# Patient Record
Sex: FEMALE | Race: WHITE | Employment: FULL TIME | URBAN - NONMETROPOLITAN AREA
[De-identification: names, ages, dates, MRNs, and addresses within clinical notes are randomized per-mention and may not be internally consistent; named-entity substitution may affect disease eponyms.]

---

## 2018-02-03 ENCOUNTER — HOSPITAL ENCOUNTER (OUTPATIENT)
Dept: LAB | Age: 38
Discharge: HOME | End: 2018-02-03
Payer: COMMERCIAL

## 2018-02-03 DIAGNOSIS — R53.83 FATIGUE, UNSPECIFIED TYPE: ICD-10-CM

## 2018-02-03 LAB
ABSOLUTE EOS #: 0 K/UL (ref 0–0.4)
ABSOLUTE IMMATURE GRANULOCYTE: NORMAL K/UL (ref 0–0.3)
ABSOLUTE LYMPH #: 2 K/UL (ref 1–4.8)
ABSOLUTE MONO #: 0.5 K/UL (ref 0–1)
ALBUMIN SERPL-MCNC: 4.2 G/DL (ref 3.5–5.2)
ALBUMIN/GLOBULIN RATIO: 1.2 (ref 1–2.5)
ALP BLD-CCNC: 67 U/L (ref 35–104)
ALT SERPL-CCNC: 14 U/L (ref 5–33)
ANION GAP SERPL CALCULATED.3IONS-SCNC: 12 MMOL/L (ref 9–17)
AST SERPL-CCNC: 16 U/L
BASOPHILS # BLD: 0 % (ref 0–2)
BASOPHILS ABSOLUTE: 0 K/UL (ref 0–0.2)
BILIRUB SERPL-MCNC: 0.26 MG/DL (ref 0.3–1.2)
BUN BLDV-MCNC: 18 MG/DL (ref 6–20)
BUN/CREAT BLD: 23 (ref 9–20)
CALCIUM SERPL-MCNC: 9.4 MG/DL (ref 8.6–10.4)
CHLORIDE BLD-SCNC: 99 MMOL/L (ref 98–107)
CHOLESTEROL/HDL RATIO: 3.5
CHOLESTEROL: 208 MG/DL
CO2: 27 MMOL/L (ref 20–31)
CREAT SERPL-MCNC: 0.77 MG/DL (ref 0.5–0.9)
DIFFERENTIAL TYPE: NORMAL
EOSINOPHILS RELATIVE PERCENT: 0 % (ref 0–8)
GFR AFRICAN AMERICAN: >60 ML/MIN
GFR NON-AFRICAN AMERICAN: >60 ML/MIN
GFR SERPL CREATININE-BSD FRML MDRD: ABNORMAL ML/MIN/{1.73_M2}
GFR SERPL CREATININE-BSD FRML MDRD: ABNORMAL ML/MIN/{1.73_M2}
GLUCOSE BLD-MCNC: 83 MG/DL (ref 70–99)
HCT VFR BLD CALC: 41.4 % (ref 36–46)
HDLC SERPL-MCNC: 59 MG/DL
HEMOGLOBIN: 13.9 G/DL (ref 12–16)
IMMATURE GRANULOCYTES: NORMAL %
LDL CHOLESTEROL: 125 MG/DL (ref 0–130)
LYMPHOCYTES # BLD: 30 % (ref 24–44)
MCH RBC QN AUTO: 29.2 PG (ref 26–34)
MCHC RBC AUTO-ENTMCNC: 33.5 G/DL (ref 31–37)
MCV RBC AUTO: 87.1 FL (ref 80–100)
MONOCYTES # BLD: 7 % (ref 0–12)
NRBC AUTOMATED: NORMAL PER 100 WBC
PDW BLD-RTO: 14.1 % (ref 12.1–15.2)
PLATELET # BLD: 204 K/UL (ref 140–450)
PLATELET ESTIMATE: NORMAL
PMV BLD AUTO: 9.3 FL (ref 6–12)
POTASSIUM SERPL-SCNC: 4 MMOL/L (ref 3.7–5.3)
RBC # BLD: 4.76 M/UL (ref 4–5.2)
RBC # BLD: NORMAL 10*6/UL
SEG NEUTROPHILS: 63 % (ref 36–66)
SEGMENTED NEUTROPHILS ABSOLUTE COUNT: 4.1 K/UL (ref 1.8–7.7)
SODIUM BLD-SCNC: 138 MMOL/L (ref 135–144)
TOTAL PROTEIN: 7.6 G/DL (ref 6.4–8.3)
TRIGL SERPL-MCNC: 122 MG/DL
TSH SERPL DL<=0.05 MIU/L-ACNC: 7.53 MIU/L (ref 0.3–5)
VLDLC SERPL CALC-MCNC: ABNORMAL MG/DL (ref 1–30)
WBC # BLD: 6.6 K/UL (ref 3.5–11)
WBC # BLD: NORMAL 10*3/UL

## 2018-02-03 PROCEDURE — 84443 ASSAY THYROID STIM HORMONE: CPT

## 2018-02-03 PROCEDURE — 80061 LIPID PANEL: CPT

## 2018-02-03 PROCEDURE — 36415 COLL VENOUS BLD VENIPUNCTURE: CPT

## 2018-02-03 PROCEDURE — 80053 COMPREHEN METABOLIC PANEL: CPT

## 2018-02-03 PROCEDURE — 85025 COMPLETE CBC W/AUTO DIFF WBC: CPT

## 2018-03-07 ENCOUNTER — HOSPITAL ENCOUNTER (OUTPATIENT)
Age: 38
Discharge: HOME | End: 2018-03-07
Payer: COMMERCIAL

## 2018-03-07 DIAGNOSIS — E03.9 HYPOTHYROIDISM, UNSPECIFIED TYPE: ICD-10-CM

## 2018-03-07 LAB — TSH SERPL DL<=0.05 MIU/L-ACNC: 0.29 MIU/L (ref 0.3–5)

## 2018-03-07 PROCEDURE — 36415 COLL VENOUS BLD VENIPUNCTURE: CPT

## 2018-03-07 PROCEDURE — 84443 ASSAY THYROID STIM HORMONE: CPT

## 2018-05-07 ENCOUNTER — HOSPITAL ENCOUNTER (OUTPATIENT)
Age: 38
Discharge: HOME | End: 2018-05-07
Payer: COMMERCIAL

## 2018-05-07 DIAGNOSIS — E03.9 HYPOTHYROIDISM, UNSPECIFIED TYPE: ICD-10-CM

## 2018-05-07 LAB — TSH SERPL DL<=0.05 MIU/L-ACNC: 0.04 MIU/L (ref 0.3–5)

## 2018-05-07 PROCEDURE — 36415 COLL VENOUS BLD VENIPUNCTURE: CPT

## 2018-05-07 PROCEDURE — 84443 ASSAY THYROID STIM HORMONE: CPT

## 2018-05-08 PROBLEM — E03.9 HYPOTHYROIDISM: Status: ACTIVE | Noted: 2018-05-08

## 2019-06-13 ENCOUNTER — HOSPITAL ENCOUNTER (EMERGENCY)
Age: 39
Discharge: HOME | End: 2019-06-13
Payer: COMMERCIAL

## 2019-06-13 VITALS
TEMPERATURE: 98.4 F | HEART RATE: 70 BPM | DIASTOLIC BLOOD PRESSURE: 86 MMHG | SYSTOLIC BLOOD PRESSURE: 124 MMHG | WEIGHT: 200 LBS | OXYGEN SATURATION: 99 % | BODY MASS INDEX: 31.32 KG/M2 | RESPIRATION RATE: 16 BRPM

## 2019-06-13 DIAGNOSIS — T78.2XXA ACUTE ANAPHYLAXIS, INITIAL ENCOUNTER: Primary | ICD-10-CM

## 2019-06-13 PROCEDURE — 96372 THER/PROPH/DIAG INJ SC/IM: CPT

## 2019-06-13 PROCEDURE — 96374 THER/PROPH/DIAG INJ IV PUSH: CPT

## 2019-06-13 PROCEDURE — 99283 EMERGENCY DEPT VISIT LOW MDM: CPT

## 2019-06-13 PROCEDURE — 2500000003 HC RX 250 WO HCPCS: Performed by: PHYSICIAN ASSISTANT

## 2019-06-13 PROCEDURE — 96376 TX/PRO/DX INJ SAME DRUG ADON: CPT

## 2019-06-13 PROCEDURE — 6360000002 HC RX W HCPCS: Performed by: PHYSICIAN ASSISTANT

## 2019-06-13 PROCEDURE — 96375 TX/PRO/DX INJ NEW DRUG ADDON: CPT

## 2019-06-13 PROCEDURE — 6360000002 HC RX W HCPCS

## 2019-06-13 RX ORDER — PREDNISONE 10 MG/1
TABLET ORAL
Qty: 30 TABLET | Refills: 0 | Status: SHIPPED | OUTPATIENT
Start: 2019-06-13 | End: 2020-07-06

## 2019-06-13 RX ORDER — FAMOTIDINE 20 MG/1
20 TABLET, FILM COATED ORAL 2 TIMES DAILY
Qty: 20 TABLET | Refills: 0 | Status: SHIPPED | OUTPATIENT
Start: 2019-06-13 | End: 2020-07-06

## 2019-06-13 RX ORDER — EPINEPHRINE 1 MG/ML
INJECTION, SOLUTION, CONCENTRATE INTRAVENOUS
Status: COMPLETED
Start: 2019-06-13 | End: 2019-06-13

## 2019-06-13 RX ORDER — DIPHENHYDRAMINE HYDROCHLORIDE 50 MG/ML
25 INJECTION INTRAMUSCULAR; INTRAVENOUS ONCE
Status: COMPLETED | OUTPATIENT
Start: 2019-06-13 | End: 2019-06-13

## 2019-06-13 RX ORDER — DIPHENHYDRAMINE HCL 25 MG
25 TABLET ORAL EVERY 6 HOURS PRN
Qty: 30 TABLET | Refills: 0 | Status: SHIPPED | OUTPATIENT
Start: 2019-06-13 | End: 2019-06-23

## 2019-06-13 RX ORDER — METHYLPREDNISOLONE SODIUM SUCCINATE 125 MG/2ML
125 INJECTION, POWDER, LYOPHILIZED, FOR SOLUTION INTRAMUSCULAR; INTRAVENOUS ONCE
Status: COMPLETED | OUTPATIENT
Start: 2019-06-13 | End: 2019-06-13

## 2019-06-13 RX ORDER — EPINEPHRINE 0.3 MG/.3ML
0.3 INJECTION SUBCUTANEOUS
Qty: 1 EACH | Refills: 2 | Status: SHIPPED | OUTPATIENT
Start: 2019-06-13 | End: 2021-07-21

## 2019-06-13 RX ORDER — EPINEPHRINE 1 MG/ML
0.3 INJECTION, SOLUTION, CONCENTRATE INTRAVENOUS ONCE
Status: COMPLETED | OUTPATIENT
Start: 2019-06-13 | End: 2019-06-13

## 2019-06-13 RX ADMIN — METHYLPREDNISOLONE SODIUM SUCCINATE 125 MG: 125 INJECTION, POWDER, FOR SOLUTION INTRAMUSCULAR; INTRAVENOUS at 11:05

## 2019-06-13 RX ADMIN — FAMOTIDINE 20 MG: 10 INJECTION, SOLUTION INTRAVENOUS at 11:07

## 2019-06-13 RX ADMIN — EPINEPHRINE 0.3 MG: 1 INJECTION, SOLUTION, CONCENTRATE INTRAVENOUS at 11:01

## 2019-06-13 RX ADMIN — DIPHENHYDRAMINE HYDROCHLORIDE 25 MG: 50 INJECTION, SOLUTION INTRAMUSCULAR; INTRAVENOUS at 14:29

## 2019-06-13 RX ADMIN — DIPHENHYDRAMINE HYDROCHLORIDE 25 MG: 50 INJECTION, SOLUTION INTRAMUSCULAR; INTRAVENOUS at 11:06

## 2019-06-13 NOTE — ED NOTES
Pt in room on stretcher with eyes closed. Pt up to the restroom to void. Pt denies needs at this time. Will continue to monitor and call light is within reach.      Faisal Figueredo RN  06/13/19 5099

## 2019-06-13 NOTE — ED PROVIDER NOTES
[facility] ED  EMERGENCY DEPARTMENT ENCOUNTER      Pt Name: Rossana Padilla  MRN: [id number]  [place] 1980  Date of evaluation: 6/13/2019  Provider: Eladio Seymour PA-C    CHIEF COMPLAINT       Chief Complaint   Patient presents with    Allergic Reaction     pt states she was changing the toner in the printer at work just PTA       [address] Arturo Story is a 45 y.o. female who presents to the emergency department from work with an allergic reaction. She has never had a reaction like this before. She was making copies at a coffee machine when she felt itching around her eyes she rubs her eyes and then the rash spread she states now she has hoarse voice swelling around her lips and eyes and itching all over. She denies feeling short of breath but does state that she has a fullness in her throat. Triage notes and Nursing notes were reviewed by myself. Any discrepancies are addressed above. PAST MEDICAL HISTORY     Past Medical History:   Diagnosis Date    Acne     Anxiety     Asthma     Dizziness and giddiness     Headache     Infection of skin and subcutaneous tissue     Palpitations     Stress        SURGICAL HISTORY       Past Surgical History:   Procedure Laterality Date    TUBAL LIGATION  4/2012    ESSURE (Dr Elo Garcia)       [address]       Discharge Medication List as of 6/13/2019  3:16 PM      CONTINUE these medications which have NOT CHANGED    Details   levothyroxine (SYNTHROID) 88 MCG tablet Take 1 tablet by mouth Daily, Disp-90 tablet, R-0Normal      albuterol sulfate HFA (PROAIR HFA) 108 (90 Base) MCG/ACT inhaler Inhale 2 puffs into the lungs every 6 hours as needed for Wheezing, Disp-1 Inhaler, R-3Normal      Sulfamethoxazole-Trimethoprim (BACTRIM PO) Take  by mouth daily. Indications: Acne Flare      spironolactone (ALDACTONE) 100 MG tablet Take 100 mg by mouth daily.              ALLERGIES     Iv dye [iodides] and Optiray [ioversol]    FAMILY HISTORY       Family History   Problem Relation Age of Onset    High Blood Pressure Mother     Heart Disease Father         SOCIAL HISTORY       Social History     Socioeconomic History    Marital status:      Spouse name: Not on file    Number of children: Not on file    Years of education: Not on file    Highest education level: Not on file   Occupational History    Not on file   Social Needs    Financial resource strain: Not on file    Food insecurity:     Worry: Not on file     Inability: Not on file    Transportation needs:     Medical: Not on file     Non-medical: Not on file   Tobacco Use    Smoking status: Former Smoker     Types: Cigarettes    Smokeless tobacco: Former User     Quit date: 8/24/2016   Substance and Sexual Activity    Alcohol use: Yes     Comment: rare    Drug use: No    Sexual activity: Never   Lifestyle    Physical activity:     Days per week: Not on file     Minutes per session: Not on file    Stress: Not on file   Relationships    Social connections:     Talks on phone: Not on file     Gets together: Not on file     Attends Jew service: Not on file     Active member of club or organization: Not on file     Attends meetings of clubs or organizations: Not on file     Relationship status: Not on file    Intimate partner violence:     Fear of current or ex partner: Not on file     Emotionally abused: Not on file     Physically abused: Not on file     Forced sexual activity: Not on file   Other Topics Concern    Not on file   Social History Narrative    Not on file       REVIEW OF SYSTEMS     Review of Systems  Except as noted above the remainder of the review of systems was reviewed and is negative.      SCREENINGS           PHYSICAL EXAM    (up to 7 for level 4, 8 or more for level 5)     ED Triage Vitals [06/13/19 1056]   BP Temp Temp Source Pulse Resp SpO2 Height Weight   (!) 140/84 98.4 °F (36.9 °C) Tympanic 67 20 100 % -- 200 lb (90.7 kg)       Physical Exam  Active and oriented ×3. Nontoxic. And is in mild distress with facial edema noted and a fullness to her voice. Well-hydrated. Head is atraumatic, there is mild edema to the upper and lower lips. Edema of the bilateral eyelids and periorbital area left greater than right. Bilateral scleral injection is noted. Pupils equal round and reactive to light, extraocular movements intact, anterior chambers clear bilaterally  Neck is supple. No adenopathy. Throat with edema of the uvula and soft palate and mild edema of the oropharynx noted, he remains patent at this time. Respirations nonlabored. Lungs clear to auscultation. No wheezes rales or rhonchi noted. Stridor noted  Heart regular rate and rhythm. No murmur noted  Facial edema and edema of the lips. Acute generalized urticaria noted. Extremities without edema. No calf tenderness noted. Distal pulses and sensation intact. Good capillary refill noted. No acute neurologic deficit noted. Good gait and balance. Clear speech. Good affect. Pleasant patient. DIAGNOSTIC RESULTS     none    EMERGENCY DEPARTMENT COURSE andMedical Decision Making:     Vitals:    Vitals:    06/13/19 1342 06/13/19 1445 06/13/19 1446 06/13/19 1533   BP: 126/77  124/86    Pulse: 74 70     Resp: 16  16 16   Temp:       TempSrc:       SpO2: 98% 97%  99%   Weight:           MDM/     Case was discussed with and the patient examined by the attending emergency physician Dr. Caballero [employer]    Patient was irrigated with epinephrine 0.3 mL's intramuscularly. She was treated with IV Solu-Medrol 125 mg, IV Benadryl 25 mg, IV Pepcid 20 mg. She began to improve her voice improved. Repeat examination showed some improvement to the edema of her uvula and soft palate although the minimal edema was still noted. She was treated with another dose of Benadryl 25 mg IV. She was observed for period of 3-1/2 hours and seemed to continually improve.   Her  arrives and sits with her and states that now her voice has returned to normal and he denies any hoarseness, she then states that she feels that there is no swelling in her throat and she has gotten better swelling in her eyes has completely resolved. On visual examination I still feel there is some minimal edema of the soft palate but this is very minimal in nature and the patient states she feels much better. I feel it is reasonable to discharge patient home with a low threshold to return should she experience any hoarseness or swelling of the throat or globus sensation she should be seen again immediately or call 911. I will prescribe EpiPen and placed on a tapering dose of prednisone Benadryl and Pepcid. I do recommend discontinuing the patient's minocycline, while she states she has been on this for many years on a daily basis she states she has not taken any other medications or antibiotics in many months and this may be the cause of the development of a new allergy I recommend stopping this medications until she discusses this further with her physician. Strict return precautions and follow up instructions were discussed with the patient with which the patient agrees    ED Medications administered this visit:    Medications   EPINEPHrine PF 1 MG/ML injection 0.3 mg (0.3 mg Intramuscular Given 6/13/19 1101)   diphenhydrAMINE (BENADRYL) injection 25 mg (25 mg Intravenous Given 6/13/19 1106)   methylPREDNISolone sodium (SOLU-MEDROL) injection 125 mg (125 mg Intravenous Given 6/13/19 1105)   famotidine (PEPCID) injection 20 mg (20 mg Intravenous Given 6/13/19 1107)   diphenhydrAMINE (BENADRYL) injection 25 mg (25 mg Intravenous Given 6/13/19 1429)       CONSULTS: (None if blank)  None    Procedures: (None if blank)       CLINICAL       1.  Acute anaphylaxis, initial encounter          DISPOSITION/PLAN   DISPOSITION Decision To Discharge 06/13/2019 03:32:46 PM      PATIENT [facility]:  [facility]  [phone]    In 2 days        DISCHARGE MEDICATIONS:  Discharge Medication List as of 6/13/2019  3:16 PM      START taking these medications    Details   predniSONE (DELTASONE) 10 MG tablet Take 4 tablets once a day for days 1 - 3, then take 3 tablets once a day on days 4 - 6, then take 2 tablets once a day on days 7 - 9 then take one tablet once a day on days 10 - 12., Disp-30 tablet, R-0Print      diphenhydrAMINE (BENADRYL ALLERGY) 25 MG tablet Take 1 tablet by mouth every 6 hours as needed for Itching, Disp-30 tablet, R-0Print      famotidine (PEPCID) 20 MG tablet Take 1 tablet by mouth 2 times daily for 10 days, Disp-20 tablet, R-0Print      EPINEPHrine (EPIPEN 2-TARA) 0.3 MG/0.3ML SOAJ injection Inject 0.3 mLs into the muscle once as needed (allergy) Use as directed for allergic reaction, Disp-1 each, R-2Print                    (Please note that portions of this note were completed with a voice recognition program.  Efforts were made to edit the dictations but occasionallywords are mis-transcribed.)      Aftab Chow PA-C (electronically signed)  Attending Emergency Department Provider         Negra Don II, PA-C  06/13/19 1214

## 2019-06-13 NOTE — ED PROVIDER NOTES
[facility]     Emergency Department     Faculty Attestation    I performed a history and physical examination of the patient and discussed management with the resident. I have reviewed and agree with the residents findings including all diagnostic interpretations, and treatment plans as written. Any areas of disagreement are noted on the chart. I was personally present for the key portions of any procedures. I have documented in the chart those procedures where I was not present during the key portions. I have reviewed the emergency nurses triage note. I agree with the chief complaint, past medical history, past surgical history, allergies, medications, social and family history as documented unless otherwise noted below. Documentation of the HPI, Physical Exam and Medical Decision Making performed by reynaibchang is based on my personal performance of the HPI, PE and MDM. For Physician Assistant/ Nurse Practitioner cases/documentation I have personally evaluated this patient and have completed at least one if not all key elements of the E/M (history, physical exam, and MDM). Additional findings are as noted. Exposed to toner of copy machine, wipe her face, and then developed swelling to her eyes, and muffled voice. Patient speaking in muffled voice, has swelling to the uvula and post phaynx, is handling oral secretions, no stridor noted. Edema to bilateral orbital regions, Left>r, with chemosis.      Plan for epinephrine, steroids, benadryl, pepcid, cardiac monitor and observe  Patient at risk of needing intubation if edema gets worse    Loretta Gray D.O, M.P.H  Attending Emergency Medicine Physician         Samy Padilla, DO  06/13/19 9603

## 2020-07-16 ENCOUNTER — HOSPITAL ENCOUNTER (OUTPATIENT)
Age: 40
Discharge: HOME | End: 2020-07-16
Payer: COMMERCIAL

## 2020-07-16 LAB — TSH SERPL DL<=0.05 MIU/L-ACNC: 1.63 MIU/L (ref 0.3–5)

## 2020-07-16 PROCEDURE — 84443 ASSAY THYROID STIM HORMONE: CPT

## 2020-07-16 PROCEDURE — 36415 COLL VENOUS BLD VENIPUNCTURE: CPT

## 2021-07-21 ENCOUNTER — HOSPITAL ENCOUNTER (EMERGENCY)
Age: 41
Discharge: HOME | End: 2021-07-21
Attending: EMERGENCY MEDICINE
Payer: COMMERCIAL

## 2021-07-21 ENCOUNTER — APPOINTMENT (OUTPATIENT)
Dept: GENERAL RADIOLOGY | Age: 41
End: 2021-07-21
Payer: COMMERCIAL

## 2021-07-21 ENCOUNTER — NURSE TRIAGE (OUTPATIENT)
Dept: OTHER | Facility: CLINIC | Age: 41
End: 2021-07-21

## 2021-07-21 VITALS
DIASTOLIC BLOOD PRESSURE: 82 MMHG | HEART RATE: 66 BPM | SYSTOLIC BLOOD PRESSURE: 137 MMHG | OXYGEN SATURATION: 99 % | RESPIRATION RATE: 16 BRPM | TEMPERATURE: 97.8 F

## 2021-07-21 DIAGNOSIS — T78.40XA ALLERGIC REACTION, INITIAL ENCOUNTER: Primary | ICD-10-CM

## 2021-07-21 PROCEDURE — 6370000000 HC RX 637 (ALT 250 FOR IP): Performed by: EMERGENCY MEDICINE

## 2021-07-21 PROCEDURE — 70360 X-RAY EXAM OF NECK: CPT

## 2021-07-21 PROCEDURE — 96372 THER/PROPH/DIAG INJ SC/IM: CPT

## 2021-07-21 PROCEDURE — 6360000002 HC RX W HCPCS: Performed by: EMERGENCY MEDICINE

## 2021-07-21 PROCEDURE — 99284 EMERGENCY DEPT VISIT MOD MDM: CPT

## 2021-07-21 RX ORDER — PREDNISONE 50 MG/1
50 TABLET ORAL DAILY
Qty: 4 TABLET | Refills: 0 | Status: SHIPPED | OUTPATIENT
Start: 2021-07-22 | End: 2021-07-26

## 2021-07-21 RX ORDER — DIPHENHYDRAMINE HYDROCHLORIDE 50 MG/ML
25 INJECTION INTRAMUSCULAR; INTRAVENOUS ONCE
Status: COMPLETED | OUTPATIENT
Start: 2021-07-21 | End: 2021-07-21

## 2021-07-21 RX ORDER — EPINEPHRINE 0.3 MG/.3ML
0.3 INJECTION SUBCUTANEOUS ONCE
Qty: 0.3 ML | Refills: 0 | Status: SHIPPED | OUTPATIENT
Start: 2021-07-21 | End: 2021-07-21

## 2021-07-21 RX ORDER — FAMOTIDINE 20 MG/1
20 TABLET, FILM COATED ORAL 2 TIMES DAILY
Qty: 10 TABLET | Refills: 0 | Status: SHIPPED | OUTPATIENT
Start: 2021-07-21 | End: 2022-06-21

## 2021-07-21 RX ORDER — METHYLPREDNISOLONE SODIUM SUCCINATE 125 MG/2ML
125 INJECTION, POWDER, LYOPHILIZED, FOR SOLUTION INTRAMUSCULAR; INTRAVENOUS ONCE
Status: COMPLETED | OUTPATIENT
Start: 2021-07-21 | End: 2021-07-21

## 2021-07-21 RX ORDER — FAMOTIDINE 20 MG/1
20 TABLET, FILM COATED ORAL ONCE
Status: COMPLETED | OUTPATIENT
Start: 2021-07-21 | End: 2021-07-21

## 2021-07-21 RX ADMIN — FAMOTIDINE 20 MG: 20 TABLET, FILM COATED ORAL at 12:27

## 2021-07-21 RX ADMIN — DIPHENHYDRAMINE HYDROCHLORIDE 25 MG: 50 INJECTION, SOLUTION INTRAMUSCULAR; INTRAVENOUS at 12:27

## 2021-07-21 RX ADMIN — METHYLPREDNISOLONE SODIUM SUCCINATE 125 MG: 125 INJECTION, POWDER, FOR SOLUTION INTRAMUSCULAR; INTRAVENOUS at 12:27

## 2021-07-21 NOTE — ED PROVIDER NOTES
levothyroxine (SYNTHROID) 88 MCG tablet TAKE ONE TABLET BY MOUTH DAILY, Disp-90 tablet, R-0Normal      spironolactone (ALDACTONE) 100 MG tablet Take 1 tablet by mouth daily, Disp-90 tablet, R-0Normal      !! EPINEPHrine (EPIPEN 2-TARA) 0.3 MG/0.3ML SOAJ injection Inject 0.3 mLs into the muscle once as needed (allergy) Use as directed for allergic reaction, Disp-1 each, R-2Print      albuterol sulfate HFA (PROAIR HFA) 108 (90 Base) MCG/ACT inhaler Inhale 2 puffs into the lungs every 6 hours as needed for Wheezing, Disp-1 Inhaler, R-3Normal      sulfamethoxazole-trimethoprim (BACTRIM;SEPTRA) 400-80 MG per tablet Take 1 tablet by mouth daily as needed (to prevent uti), Disp-30 tablet, R-2Normal       !! - Potential duplicate medications found. Please discuss with provider. ALLERGIES     Iv dye [iodides] and Optiray [ioversol]    FAMILY HISTORY       Family History   Problem Relation Age of Onset    High Blood Pressure Mother     Heart Disease Father           SOCIAL HISTORY       Social History     Socioeconomic History    Marital status:      Spouse name: None    Number of children: None    Years of education: None    Highest education level: None   Occupational History    None   Tobacco Use    Smoking status: Former Smoker     Types: Cigarettes    Smokeless tobacco: Former User     Quit date: 8/24/2016   Substance and Sexual Activity    Alcohol use: Yes     Comment: rare    Drug use: No    Sexual activity: Never   Other Topics Concern    None   Social History Narrative    None     Social Determinants of Health     Financial Resource Strain:     Difficulty of Paying Living Expenses:    Food Insecurity:     Worried About Running Out of Food in the Last Year:     Ran Out of Food in the Last Year:    Transportation Needs:     Lack of Transportation (Medical):      Lack of Transportation (Non-Medical):    Physical Activity:     Days of Exercise per Week:     Minutes of Exercise per Session:    Stress:     Feeling of Stress :    Social Connections:     Frequency of Communication with Friends and Family:     Frequency of Social Gatherings with Friends and Family:     Attends Caodaism Services:     Active Member of Clubs or Organizations:     Attends Club or Organization Meetings:     Marital Status:    Intimate Partner Violence:     Fear of Current or Ex-Partner:     Emotionally Abused:     Physically Abused:     Sexually Abused:        SCREENINGS                        PHYSICAL EXAM    (up to 7 for level 4, 8 or more for level 5)     ED Triage Vitals [07/21/21 1146]   BP Temp Temp Source Pulse Resp SpO2 Height Weight   132/82 97.8 °F (36.6 °C) Tympanic 64 18 99 % -- --       Physical Exam  Vitals and nursing note reviewed. Constitutional:       General: She is not in acute distress. Appearance: She is not toxic-appearing. Comments: Resting comfortably. No stridor. Handling secretions. Able to swallow without difficulty. HENT:      Head: Normocephalic and atraumatic. Mouth/Throat:      Mouth: Mucous membranes are moist.      Pharynx: No oropharyngeal exudate or posterior oropharyngeal erythema. Eyes:      Extraocular Movements: Extraocular movements intact. Conjunctiva/sclera: Conjunctivae normal.   Cardiovascular:      Rate and Rhythm: Normal rate and regular rhythm. Pulmonary:      Effort: Pulmonary effort is normal. No respiratory distress. Breath sounds: Normal breath sounds. Abdominal:      General: There is no distension. Palpations: Abdomen is soft. Tenderness: There is no abdominal tenderness. Musculoskeletal:         General: No swelling or tenderness. Normal range of motion. Cervical back: Normal range of motion and neck supple. Skin:     General: Skin is warm and dry. Neurological:      General: No focal deficit present. Mental Status: She is alert and oriented to person, place, and time.    Psychiatric: Mood and Affect: Mood normal.         DIAGNOSTIC RESULTS     EKG: All EKG's are interpreted by the Emergency Department Physician who either signs or Co-signs this chart in the absence of a cardiologist.        RADIOLOGY:   Non-plain film images such as CT, Ultrasound and MRI are read by the radiologist. Plain radiographic images are visualized and preliminarily interpreted by the emergency physician with the below findings:        Interpretation per the Radiologist below, if available at the time of this note:    XR NECK SOFT TISSUE   Final Result   Unremarkable soft tissues of the neck. ED BEDSIDE ULTRASOUND:   Performed by ED Physician - none    LABS:  Labs Reviewed - No data to display    All other labs were within normal range or not returned as of this dictation. EMERGENCY DEPARTMENT COURSE and DIFFERENTIAL DIAGNOSIS/MDM:   Vitals:    Vitals:    07/21/21 1300 07/21/21 1330 07/21/21 1339 07/21/21 1343   BP: (!) 140/76 137/82     Pulse: 62 70 66    Resp: 13 15 14 16   Temp:       TempSrc:       SpO2: 100% 100% 99%            MDM  Number of Diagnoses or Management Options  Allergic reaction, initial encounter  Diagnosis management comments: 80-year-old female with a history of severe allergic reactions presented emergency department for evaluation of scratchy throat after having allergy symptoms earlier. Patient had used her EpiPen at home. Her heart rate was in the 60s and her blood pressure was normal in the ED. She is not having any chest pain. Physical exam unremarkable. Soft tissue film of the neck was unremarkable per radiology read. Patient was resting comfortably. Unknown trigger. Patient was started on prednisone Benadryl and Pepcid. Stable for discharge home. Treatment plan ED return and follow-up instructions discussed prior to discharge.       [facility]       REASSESSMENT          CRITICAL CARE TIME   Total Critical Care time was  minutes, excluding separately reportable procedures. There was a high probability of clinically significant/life threatening deterioration in the patient's condition which required my urgent intervention. CONSULTS:  None    PROCEDURES:  Unless otherwise noted below, none     Procedures        FINAL IMPRESSION      1. Allergic reaction, initial encounter          DISPOSITION/PLAN   DISPOSITION Decision To Discharge 07/21/2021 01:30:26 PM      PATIENT REFERRED TO:  Iram Georges MD  [address]  [phone]      As needed      DISCHARGE MEDICATIONS:  Discharge Medication List as of 7/21/2021  1:39 PM      START taking these medications    Details   predniSONE (DELTASONE) 50 MG tablet Take 1 tablet by mouth daily for 4 days, Disp-4 tablet, R-0Normal      famotidine (PEPCID) 20 MG tablet Take 1 tablet by mouth 2 times daily for 5 days, Disp-10 tablet, R-0Normal      !! EPINEPHrine (EPIPEN 2-TARA) 0.3 MG/0.3ML SOAJ injection Inject 0.3 mLs into the muscle once for 1 dose Use as directed for allergic reaction, Disp-0.3 mL, R-0Normal       !! - Potential duplicate medications found. Please discuss with provider. Controlled Substances Monitoring:     No flowsheet data found.     (Please note that portions of this note were completed with a voice recognition program.  Efforts were made to edit the dictations but occasionally words are mis-transcribed.)    Jordan Bertrand MD (electronically signed)  Attending Emergency Physician             Jordan Bertrand MD  07/23/21 6595

## 2021-07-21 NOTE — TELEPHONE ENCOUNTER
Reason for Disposition   SEVERE difficulty swallowing (e.g., drooling or spitting, can't swallow water)    Answer Assessment - Initial Assessment Questions  1. SYMPTOM: [place] you having difficulty swallowing liquids, solids, or both? \"      Had an allergic reaction and was given Epinephrine about 10 minutes ago. She had swelling of the throat, eyes and difficulty swallowing. 2. ONSET: \"When did the swallowing problems begin? \"       15 minutes ago    3. CAUSE: \"What do you think is causing the problem? \"       Unknown    4. CHRONIC/RECURRENT: \"Is this a new problem for you? \"  If no, ask: \"How long have you had this problem? \" (e.g., days, weeks, months)       No    5. OTHER SYMPTOMS: \"Do you have any other symptoms? \" (e.g., difficulty breathing, sore throat, swollen tongue, chest pain)      Itchy palms and scratchy throat continues     6. PREGNANCY: \"Is there any chance you are pregnant? \" \"When was your last menstrual period? \"      Unsure    Protocols used: SWALLOWING DIFFICULTY-ADULT-AH    Received call from "[employer]  at [facility]  with [facility]. Brief description of triage: patient had an anaphylactic reaction to an unknown substance, face, tongue, and eyes were swelling  Patient was having throat swelling. Epi pen was admniistered to the patient about 15 minutes ago, she continues to have scratchy throat and itching palms. She would like to know if she should go to the ED. Triage indicates for patient to go to ED now    Care advice provided, patient verbalizes understanding; denies any other questions or concerns; instructed to call back for any new or worsening symptoms. Attention Provider: Thank you for allowing me to participate in the care of your patient. The patient was connected to triage in response to information provided to the [facility]. Please do not respond through this encounter as the response is not directed to a shared pool.

## 2021-07-26 ENCOUNTER — OFFICE VISIT (OUTPATIENT)
Dept: PRIMARY CARE CLINIC | Age: 41
End: 2021-07-26
Payer: COMMERCIAL

## 2021-07-26 VITALS
WEIGHT: 199.8 LBS | SYSTOLIC BLOOD PRESSURE: 137 MMHG | BODY MASS INDEX: 31.36 KG/M2 | HEART RATE: 68 BPM | DIASTOLIC BLOOD PRESSURE: 83 MMHG | HEIGHT: 67 IN

## 2021-07-26 DIAGNOSIS — R53.83 FATIGUE, UNSPECIFIED TYPE: ICD-10-CM

## 2021-07-26 DIAGNOSIS — E03.9 HYPOTHYROIDISM, UNSPECIFIED TYPE: Primary | ICD-10-CM

## 2021-07-26 DIAGNOSIS — T78.40XD ALLERGIC REACTION, SUBSEQUENT ENCOUNTER: ICD-10-CM

## 2021-07-26 LAB
ABSOLUTE BASO #: 0 X10E9/L (ref 0–0.2)
ABSOLUTE EOS #: 0 X10E9/L (ref 0–0.4)
ABSOLUTE LYMPH #: 2.5 X10E9/L (ref 1–3.5)
ABSOLUTE MONO #: 0.8 X10E9/L (ref 0–0.9)
ABSOLUTE NEUT #: 11.2 X10E9/L (ref 1.5–6.6)
ALBUMIN SERPL-MCNC: 4.4 G/DL (ref 3.2–5.3)
ALK PHOSPHATASE: 44 U/L (ref 39–130)
ALT SERPL-CCNC: 13 U/L (ref 0–31)
ANION GAP SERPL CALCULATED.3IONS-SCNC: 9 MMOL/L (ref 5–15)
AST SERPL-CCNC: 9 U/L (ref 0–41)
BASOPHILS RELATIVE PERCENT: 0.3 %
BILIRUB SERPL-MCNC: 0.3 MG/DL (ref 0.3–1.2)
BUN BLDV-MCNC: 12 MG/DL (ref 5–23)
CALCIUM SERPL-MCNC: 9.3 MG/DL (ref 8.5–10.5)
CHLORIDE BLD-SCNC: 106 MMOL/L (ref 98–109)
CO2: 27 MMOL/L (ref 22–32)
CREAT SERPL-MCNC: 0.81 MG/DL (ref 0.4–1)
EGFR AFRICAN AMERICAN: >60 ML/MIN/1.73SQ.M
EGFR IF NONAFRICAN AMERICAN: >60 ML/MIN/1.73SQ.M
EOSINOPHILS RELATIVE PERCENT: 0.1 %
GLUCOSE: 78 MG/DL (ref 65–99)
HCT VFR BLD CALC: 41.1 % (ref 35–47)
HEMOGLOBIN: 13.8 G/DL (ref 11.7–15.5)
LYMPHOCYTE %: 17.2 %
MCH RBC QN AUTO: 30.6 PG (ref 27–34)
MCHC RBC AUTO-ENTMCNC: 33.5 G/DL (ref 32–36)
MCV RBC AUTO: 91 FL (ref 80–100)
MONOCYTES # BLD: 5.7 %
NEUTROPHILS RELATIVE PERCENT: 76.7 %
PDW BLD-RTO: 13.2 % (ref 11.5–15)
PLATELETS: 226 X10E9/L (ref 150–450)
PMV BLD AUTO: 9.2 FL (ref 7–12)
POTASSIUM SERPL-SCNC: 4 MMOL/L (ref 3.5–5)
RBC: 4.51 X10E12/L (ref 3.8–5.2)
SODIUM BLD-SCNC: 142 MMOL/L (ref 134–146)
T4 FREE: 0.93 NG/DL (ref 0.61–1.6)
TOTAL PROTEIN: 7.2 G/DL (ref 6–8)
TSH SERPL DL<=0.05 MIU/L-ACNC: 0.15 UIU/ML (ref 0.49–4.67)
WBC: 14.7 X10E9/L (ref 4–11)

## 2021-07-26 PROCEDURE — 99214 OFFICE O/P EST MOD 30 MIN: CPT | Performed by: FAMILY MEDICINE

## 2021-07-26 PROCEDURE — 1036F TOBACCO NON-USER: CPT | Performed by: FAMILY MEDICINE

## 2021-07-26 PROCEDURE — G8427 DOCREV CUR MEDS BY ELIG CLIN: HCPCS | Performed by: FAMILY MEDICINE

## 2021-07-26 PROCEDURE — G8417 CALC BMI ABV UP PARAM F/U: HCPCS | Performed by: FAMILY MEDICINE

## 2021-07-26 RX ORDER — MELOXICAM 15 MG/1
15 TABLET ORAL DAILY PRN
Qty: 30 TABLET | Refills: 5 | Status: SHIPPED | OUTPATIENT
Start: 2021-07-26 | End: 2021-12-27 | Stop reason: SDUPTHER

## 2021-07-26 RX ORDER — LEVOTHYROXINE SODIUM 88 UG/1
88 TABLET ORAL DAILY
Refills: 0 | Status: CANCELLED | OUTPATIENT
Start: 2021-07-26

## 2021-07-26 SDOH — ECONOMIC STABILITY: FOOD INSECURITY: WITHIN THE PAST 12 MONTHS, YOU WORRIED THAT YOUR FOOD WOULD RUN OUT BEFORE YOU GOT MONEY TO BUY MORE.: NEVER TRUE

## 2021-07-26 SDOH — ECONOMIC STABILITY: FOOD INSECURITY: WITHIN THE PAST 12 MONTHS, THE FOOD YOU BOUGHT JUST DIDN'T LAST AND YOU DIDN'T HAVE MONEY TO GET MORE.: NEVER TRUE

## 2021-07-26 SDOH — ECONOMIC STABILITY: TRANSPORTATION INSECURITY
IN THE PAST 12 MONTHS, HAS LACK OF TRANSPORTATION KEPT YOU FROM MEETINGS, WORK, OR FROM GETTING THINGS NEEDED FOR DAILY LIVING?: NO

## 2021-07-26 SDOH — ECONOMIC STABILITY: TRANSPORTATION INSECURITY
IN THE PAST 12 MONTHS, HAS THE LACK OF TRANSPORTATION KEPT YOU FROM MEDICAL APPOINTMENTS OR FROM GETTING MEDICATIONS?: NO

## 2021-07-26 ASSESSMENT — PATIENT HEALTH QUESTIONNAIRE - PHQ9
SUM OF ALL RESPONSES TO PHQ QUESTIONS 1-9: 0
2. FEELING DOWN, DEPRESSED OR HOPELESS: 0
SUM OF ALL RESPONSES TO PHQ QUESTIONS 1-9: 0
SUM OF ALL RESPONSES TO PHQ9 QUESTIONS 1 & 2: 0
SUM OF ALL RESPONSES TO PHQ QUESTIONS 1-9: 0
1. LITTLE INTEREST OR PLEASURE IN DOING THINGS: 0

## 2021-07-26 ASSESSMENT — SOCIAL DETERMINANTS OF HEALTH (SDOH): HOW HARD IS IT FOR YOU TO PAY FOR THE VERY BASICS LIKE FOOD, HOUSING, MEDICAL CARE, AND HEATING?: NOT HARD AT ALL

## 2021-07-26 NOTE — PROGRESS NOTES
mouth daily for 4 days 7/22/21 7/26/21  Barry Schreiber MD   EPINEPHrine (EPIPEN 2-TARA) 0.3 MG/0.3ML SOAJ injection Inject 0.3 mLs into the muscle once for 1 dose Use as directed for allergic reaction 7/21/21 7/21/21  Barry Schreiber MD   EPINEPHrine (EPIPEN 2-TARA) 0.3 MG/0.3ML SOAJ injection Inject 0.3 mLs into the muscle once as needed (allergy) Use as directed for allergic reaction 6/13/19 7/21/21  Taina Parry II, PA-C       Review of Systems:  Constitutional: negative for fevers or chills, has fatiugue   Eyes: negative for visual disturbance   ENT: negative for sore throat or nasal congestion  Respiratory: negative for shortness of breath or cough  Cardiovascular: negative for chest pain ,palpitations,pnd,syncope  Gastrointestinal: negative for abd pain, nausea, vomiting, diarrhea , constipation,hemetemesis,pavan,blood in stool  Genitourinary: negative for dysuria, urgency ,frequency,hematuria  Integument/breast: negative for skin rash or lesions  Neurological: negative for unilateral weakness, numbness or tingling. Skeletal Muscular: no joint pain,jont swelling,back pain    Subjective: There were no vitals filed for this visit. Exam:  GEN:   A & O x3, no apparent distress  EYES: No gross abnormalities. ENT:ENT exam normal, no neck nodes or sinus tenderness  NECK: normal, supple, no lymphadenopathy,  no carotid bruits  PULM: clear to auscultation bilaterally- no wheezes, rales or rhonchi, normal air movement, no respiratory distress  COR: regular rate & rhythm, no murmurs and no gallops  ABD:  soft, non-tender, non-distended, normal bowel sounds, no masses or organomegaly  : deferred  EXT: Extremities: + 2 pedal pulses, no edema or calf tenderness, and warm to touch. Normal nails without lesions  Skeletomuscular:  NEURO: Motor and sensory grossly intact  SKIN:  No skin lesions or rashes      Assessment:  1. Hypothyroidism, unspecified type    2. Fatigue, unspecified type    3.  Allergic reaction, subsequent encounter          Plan:      ICD-10-CM    1. Hypothyroidism, unspecified type  E03.9 TSH With Reflex Ft4   2. Fatigue, unspecified type  R53.83 CBC Auto Differential     Comprehensive Metabolic Panel       Orders Placed This Encounter   Procedures    TSH With Reflex Ft4     Standing Status:   Future     Standing Expiration Date:   7/26/2022    CBC Auto Differential     Standing Status:   Future     Standing Expiration Date:   7/26/2022    Comprehensive Metabolic Panel     Standing Status:   Future     Standing Expiration Date:   7/26/2022    External Referral To Allergy     Referral Priority:   Routine     Referral Type:   Eval and Treat     Referral Reason:   Specialty Services Required     Requested Specialty:   Allergy     Number of Visits Requested:   1     No follow-ups on file. No orders of the defined types were placed in this encounter.     Medication directions and side effects discussed      Electronically signed by Donis Jeronimo MD on 7/26/2021 at 8:28 Lilia Manzano MD, MD

## 2021-07-27 ENCOUNTER — TELEPHONE (OUTPATIENT)
Dept: PRIMARY CARE CLINIC | Age: 41
End: 2021-07-27

## 2021-07-27 DIAGNOSIS — E03.9 HYPOTHYROIDISM, UNSPECIFIED TYPE: Primary | ICD-10-CM

## 2021-07-27 RX ORDER — LEVOTHYROXINE SODIUM 0.07 MG/1
75 TABLET ORAL DAILY
Qty: 90 TABLET | Refills: 1 | Status: SHIPPED | OUTPATIENT
Start: 2021-07-27 | End: 2021-12-30 | Stop reason: SDUPTHER

## 2021-07-27 NOTE — TELEPHONE ENCOUNTER
----- Message from Yue German MD sent at 7/27/2021  8:05 AM EDT -----  Tsh low,needs to decrease dose to 75 mcg daily and check tsh 3 months

## 2021-07-27 NOTE — TELEPHONE ENCOUNTER
Patient informed of lab results and change in medication. Patient verbalizes understanding, but states she continues to have weigh gain, fatigue and hair loss. Patient requests referral to specialist to review further. Patient will check insurance regarding local endocrinologist and call back.

## 2021-08-24 NOTE — TELEPHONE ENCOUNTER
Patient would like referral to Dr. Jensen Sher in [facility], Endocrinology, and Dr. Naren Marcus for Allergist.    Referrals faxed per patient request.

## 2021-09-23 ENCOUNTER — E-VISIT (OUTPATIENT)
Dept: FAMILY MEDICINE CLINIC | Age: 41
End: 2021-09-23
Payer: COMMERCIAL

## 2021-09-23 DIAGNOSIS — N39.0 ACUTE UTI: Primary | ICD-10-CM

## 2021-09-23 PROCEDURE — 98970 NQHP OL DIG ASSMT&MGMT 5-10: CPT | Performed by: NURSE PRACTITIONER

## 2021-09-23 RX ORDER — NITROFURANTOIN 25; 75 MG/1; MG/1
100 CAPSULE ORAL 2 TIMES DAILY
Qty: 14 CAPSULE | Refills: 0 | Status: SHIPPED | OUTPATIENT
Start: 2021-09-23 | End: 2021-09-30

## 2021-12-27 RX ORDER — MELOXICAM 15 MG/1
15 TABLET ORAL DAILY PRN
Qty: 30 TABLET | Refills: 5 | Status: SHIPPED | OUTPATIENT
Start: 2021-12-27 | End: 2022-06-09 | Stop reason: SDUPTHER

## 2021-12-30 DIAGNOSIS — E03.9 HYPOTHYROIDISM, UNSPECIFIED TYPE: ICD-10-CM

## 2021-12-30 RX ORDER — LEVOTHYROXINE SODIUM 0.07 MG/1
75 TABLET ORAL DAILY
Qty: 90 TABLET | Refills: 1 | Status: SHIPPED | OUTPATIENT
Start: 2021-12-30 | End: 2022-07-28 | Stop reason: SDUPTHER

## 2022-03-14 ENCOUNTER — HOSPITAL ENCOUNTER (OUTPATIENT)
Age: 42
Discharge: HOME | End: 2022-03-14
Payer: COMMERCIAL

## 2022-03-14 LAB
ALBUMIN SERPL-MCNC: 4.6 G/DL (ref 3.5–5.2)
ALBUMIN/GLOBULIN RATIO: 1.6 (ref 1–2.5)
ALP BLD-CCNC: 57 U/L (ref 35–104)
ALT SERPL-CCNC: 9 U/L (ref 5–33)
ANION GAP SERPL CALCULATED.3IONS-SCNC: 8 MMOL/L (ref 9–17)
AST SERPL-CCNC: 10 U/L
BILIRUB SERPL-MCNC: 0.23 MG/DL (ref 0.3–1.2)
BUN BLDV-MCNC: 12 MG/DL (ref 6–20)
BUN/CREAT BLD: 17 (ref 9–20)
CALCIUM SERPL-MCNC: 9.7 MG/DL (ref 8.6–10.4)
CHLORIDE BLD-SCNC: 104 MMOL/L (ref 98–107)
CO2: 27 MMOL/L (ref 20–31)
CREAT SERPL-MCNC: 0.7 MG/DL (ref 0.5–0.9)
GFR AFRICAN AMERICAN: >60 ML/MIN
GFR NON-AFRICAN AMERICAN: >60 ML/MIN
GFR SERPL CREATININE-BSD FRML MDRD: ABNORMAL ML/MIN/{1.73_M2}
GFR SERPL CREATININE-BSD FRML MDRD: ABNORMAL ML/MIN/{1.73_M2}
GLUCOSE BLD-MCNC: 129 MG/DL (ref 70–99)
POTASSIUM SERPL-SCNC: 3.9 MMOL/L (ref 3.7–5.3)
SODIUM BLD-SCNC: 139 MMOL/L (ref 135–144)
TOTAL PROTEIN: 7.4 G/DL (ref 6.4–8.3)

## 2022-03-14 PROCEDURE — 36415 COLL VENOUS BLD VENIPUNCTURE: CPT

## 2022-03-14 PROCEDURE — 80053 COMPREHEN METABOLIC PANEL: CPT

## 2022-03-16 ENCOUNTER — E-VISIT (OUTPATIENT)
Dept: PRIMARY CARE CLINIC | Age: 42
End: 2022-03-16
Payer: COMMERCIAL

## 2022-03-16 DIAGNOSIS — J06.9 UPPER RESPIRATORY TRACT INFECTION, UNSPECIFIED TYPE: Primary | ICD-10-CM

## 2022-03-16 PROCEDURE — 99422 OL DIG E/M SVC 11-20 MIN: CPT | Performed by: NURSE PRACTITIONER

## 2022-03-16 RX ORDER — AMOXICILLIN AND CLAVULANATE POTASSIUM 875; 125 MG/1; MG/1
1 TABLET, FILM COATED ORAL 2 TIMES DAILY
Qty: 20 TABLET | Refills: 0 | Status: SHIPPED | OUTPATIENT
Start: 2022-03-16 | End: 2022-03-26

## 2022-03-16 ASSESSMENT — LIFESTYLE VARIABLES
PACKS_PER_DAY: 1
SMOKING_STATUS: NO, I'M A FORMER SMOKER
SMOKING_YEARS: 18

## 2022-03-16 NOTE — PROGRESS NOTES
Reviewed questionnaire    Reviewed meds/allergies    Dx URI    Plan Rx given for augmentin, follow up with PCP if no improvement    Time spent on visit 11 min

## 2022-06-02 RX ORDER — ALBUTEROL SULFATE 90 UG/1
2 AEROSOL, METERED RESPIRATORY (INHALATION) EVERY 6 HOURS PRN
Qty: 1 EACH | Refills: 2 | OUTPATIENT
Start: 2022-06-02

## 2022-06-02 RX ORDER — MELOXICAM 15 MG/1
15 TABLET ORAL DAILY PRN
Qty: 30 TABLET | Refills: 5 | OUTPATIENT
Start: 2022-06-02

## 2022-06-06 RX ORDER — MELOXICAM 15 MG/1
15 TABLET ORAL DAILY PRN
Qty: 30 TABLET | Refills: 5 | OUTPATIENT
Start: 2022-06-06

## 2022-06-06 RX ORDER — ALBUTEROL SULFATE 90 UG/1
2 AEROSOL, METERED RESPIRATORY (INHALATION) EVERY 6 HOURS PRN
OUTPATIENT
Start: 2022-06-06

## 2022-06-09 ENCOUNTER — PATIENT MESSAGE (OUTPATIENT)
Dept: PRIMARY CARE CLINIC | Age: 42
End: 2022-06-09

## 2022-06-09 RX ORDER — MELOXICAM 15 MG/1
15 TABLET ORAL DAILY PRN
Qty: 30 TABLET | Refills: 5 | Status: SHIPPED | OUTPATIENT
Start: 2022-06-09

## 2022-06-09 RX ORDER — MELOXICAM 15 MG/1
TABLET ORAL
Qty: 30 TABLET | Refills: 5 | OUTPATIENT
Start: 2022-06-09

## 2022-06-09 RX ORDER — ALBUTEROL SULFATE 90 UG/1
2 AEROSOL, METERED RESPIRATORY (INHALATION) EVERY 6 HOURS PRN
Qty: 1 EACH | Refills: 0 | Status: SHIPPED | OUTPATIENT
Start: 2022-06-09

## 2022-06-09 RX ORDER — ALBUTEROL SULFATE 90 UG/1
AEROSOL, METERED RESPIRATORY (INHALATION)
Qty: 8.5 G | OUTPATIENT
Start: 2022-06-09

## 2022-06-09 NOTE — TELEPHONE ENCOUNTER
From: MAURO MAYS  To: Lj Jo  Sent: 6/9/2022 11:14 AM EDT  Subject: Medication Refills    Mode Royal,     We did receive your request for refills, Dr. Serena Ya would like you to schedule an appointment within the next 30 days or so. Once we have you scheduled we can get medications sent in for you.      Thanks, [facility]

## 2022-06-21 ENCOUNTER — HOSPITAL ENCOUNTER (OUTPATIENT)
Age: 42
Discharge: HOME | End: 2022-06-21
Payer: COMMERCIAL

## 2022-06-21 ENCOUNTER — OFFICE VISIT (OUTPATIENT)
Dept: PRIMARY CARE CLINIC | Age: 42
End: 2022-06-21
Payer: COMMERCIAL

## 2022-06-21 VITALS — BODY MASS INDEX: 29.01 KG/M2 | WEIGHT: 185.2 LBS | DIASTOLIC BLOOD PRESSURE: 86 MMHG | SYSTOLIC BLOOD PRESSURE: 122 MMHG

## 2022-06-21 DIAGNOSIS — G89.29 CHRONIC NECK PAIN: ICD-10-CM

## 2022-06-21 DIAGNOSIS — E03.9 HYPOTHYROIDISM, UNSPECIFIED TYPE: ICD-10-CM

## 2022-06-21 DIAGNOSIS — E03.9 HYPOTHYROIDISM, UNSPECIFIED TYPE: Primary | ICD-10-CM

## 2022-06-21 DIAGNOSIS — M54.2 CHRONIC NECK PAIN: ICD-10-CM

## 2022-06-21 LAB
ABSOLUTE EOS #: 0.15 K/UL (ref 0–0.44)
ABSOLUTE IMMATURE GRANULOCYTE: <0.03 K/UL (ref 0–0.3)
ABSOLUTE LYMPH #: 1.87 K/UL (ref 1.1–3.7)
ABSOLUTE MONO #: 0.42 K/UL (ref 0.1–1.2)
ALBUMIN SERPL-MCNC: 4.7 G/DL (ref 3.5–5.2)
ALBUMIN/GLOBULIN RATIO: 1.9 (ref 1–2.5)
ALP BLD-CCNC: 53 U/L (ref 35–104)
ALT SERPL-CCNC: 12 U/L (ref 5–33)
ANION GAP SERPL CALCULATED.3IONS-SCNC: 9 MMOL/L (ref 9–17)
AST SERPL-CCNC: 10 U/L
BASOPHILS # BLD: 1 % (ref 0–2)
BASOPHILS ABSOLUTE: 0.04 K/UL (ref 0–0.2)
BILIRUB SERPL-MCNC: 0.4 MG/DL (ref 0.3–1.2)
BUN BLDV-MCNC: 14 MG/DL (ref 6–20)
BUN/CREAT BLD: 18 (ref 9–20)
CALCIUM SERPL-MCNC: 9.7 MG/DL (ref 8.6–10.4)
CHLORIDE BLD-SCNC: 106 MMOL/L (ref 98–107)
CO2: 24 MMOL/L (ref 20–31)
CREAT SERPL-MCNC: 0.78 MG/DL (ref 0.5–0.9)
EOSINOPHILS RELATIVE PERCENT: 3 % (ref 1–4)
GFR AFRICAN AMERICAN: >60 ML/MIN
GFR NON-AFRICAN AMERICAN: >60 ML/MIN
GFR SERPL CREATININE-BSD FRML MDRD: NORMAL ML/MIN/{1.73_M2}
GFR SERPL CREATININE-BSD FRML MDRD: NORMAL ML/MIN/{1.73_M2}
GLUCOSE BLD-MCNC: 91 MG/DL (ref 70–99)
HCT VFR BLD CALC: 42.4 % (ref 36.3–47.1)
HEMOGLOBIN: 13.3 G/DL (ref 11.9–15.1)
IMMATURE GRANULOCYTES: 0 %
LYMPHOCYTES # BLD: 33 % (ref 24–43)
MCH RBC QN AUTO: 29.8 PG (ref 25.2–33.5)
MCHC RBC AUTO-ENTMCNC: 31.4 G/DL (ref 28.4–34.8)
MCV RBC AUTO: 94.9 FL (ref 82.6–102.9)
MONOCYTES # BLD: 7 % (ref 3–12)
NRBC AUTOMATED: 0 PER 100 WBC
PDW BLD-RTO: 12.9 % (ref 11.8–14.4)
PLATELET # BLD: 224 K/UL (ref 138–453)
PMV BLD AUTO: 10.3 FL (ref 8.1–13.5)
POTASSIUM SERPL-SCNC: 5 MMOL/L (ref 3.7–5.3)
RBC # BLD: 4.47 M/UL (ref 3.95–5.11)
SEG NEUTROPHILS: 56 % (ref 36–65)
SEGMENTED NEUTROPHILS ABSOLUTE COUNT: 3.16 K/UL (ref 1.5–8.1)
SODIUM BLD-SCNC: 139 MMOL/L (ref 135–144)
TOTAL PROTEIN: 7.2 G/DL (ref 6.4–8.3)
TSH SERPL DL<=0.05 MIU/L-ACNC: 0.68 UIU/ML (ref 0.3–5)
WBC # BLD: 5.7 K/UL (ref 3.5–11.3)

## 2022-06-21 PROCEDURE — 85025 COMPLETE CBC W/AUTO DIFF WBC: CPT

## 2022-06-21 PROCEDURE — 99213 OFFICE O/P EST LOW 20 MIN: CPT | Performed by: FAMILY MEDICINE

## 2022-06-21 PROCEDURE — 36415 COLL VENOUS BLD VENIPUNCTURE: CPT

## 2022-06-21 PROCEDURE — 80053 COMPREHEN METABOLIC PANEL: CPT

## 2022-06-21 PROCEDURE — 84443 ASSAY THYROID STIM HORMONE: CPT

## 2022-06-21 ASSESSMENT — PATIENT HEALTH QUESTIONNAIRE - PHQ9
SUM OF ALL RESPONSES TO PHQ QUESTIONS 1-9: 0
2. FEELING DOWN, DEPRESSED OR HOPELESS: 0
SUM OF ALL RESPONSES TO PHQ9 QUESTIONS 1 & 2: 0
1. LITTLE INTEREST OR PLEASURE IN DOING THINGS: 0
SUM OF ALL RESPONSES TO PHQ QUESTIONS 1-9: 0

## 2022-06-21 NOTE — PATIENT INSTRUCTIONS
SURVEY:    You may be receiving a survey from [employer] regarding your visit today. You may get this in the mail, through your MyChart, or in your email. Please complete the survey to enable us to provide the highest quality of care to you and your family. If you cannot score us a very good (5 Stars) on any question, please call the office to discuss how we could of made your experience exceptional.    Thank you!     Dr. Olman Wilson, MAURO Cooper, [address] [place] [address], [address] [place] [address]    Phone: [phone]  Fax: [phone]    Office Hours:   Amara De Santiago, [address] [facility], F: 8-5

## 2022-06-21 NOTE — PROGRESS NOTES
Hypothyroidism: Patient states not as fatigued as she was during the last visit with us. Her last TSH test was completed on 07/26/2021. CURRENT ALLERGIES: Iv dye [iodides] and Optiray [ioversol]    PAST MEDICAL HISTORY:   Past Medical History:   Diagnosis Date    Acne     Anxiety     Asthma     Dizziness and giddiness     Headache     Infection of skin and subcutaneous tissue     Palpitations     Stress        SURGICAL HISTORY:   Past Surgical History:   Procedure Laterality Date    TUBAL LIGATION  4/2012    ESSURE (Dr Monet White)       FAMILY HISTORY:   Family History   Problem Relation Age of Onset    High Blood Pressure Mother     Heart Disease Father        SOCIAL HISTORY:   Social History     Tobacco Use    Smoking status: Former Smoker     Packs/day: 1.00     Years: 13.00     Pack years: 13.00     Types: Cigarettes    Smokeless tobacco: Former User     Quit date: 8/24/2016   Substance Use Topics    Alcohol use: Yes     Comment: rare    Drug use: No     Prior to Admission medications    Medication Sig Start Date End Date Taking?  Authorizing Provider   albuterol sulfate HFA (PROAIR HFA) 108 (90 Base) MCG/ACT inhaler Inhale 2 puffs into the lungs every 6 hours as needed for Wheezing 6/9/22  Yes Yue German MD   meloxicam (MOBIC) 15 MG tablet Take 1 tablet by mouth daily as needed for Pain 6/9/22  Yes Yue German MD   levothyroxine (SYNTHROID) 75 MCG tablet Take 1 tablet by mouth daily 12/30/21  Yes Yue German MD   spironolactone (ALDACTONE) 100 MG tablet Take 1 tablet by mouth daily 12/9/19  Yes Yue German MD   sulfamethoxazole-trimethoprim (BACTRIM;SEPTRA) 400-80 MG per tablet Take 1 tablet by mouth daily as needed (to prevent uti) 12/9/19  Yes Yue German MD   EPINEPHrine (EPIPEN 2-TARA) 0.3 MG/0.3ML SOAJ injection Inject 0.3 mLs into the muscle once for 1 dose Use as directed for allergic reaction 7/21/21 7/21/21  Josh Quinn MD   EPINEPHrine [facility] 2-TARA) 0.3 MG/0.3ML SOAJ injection Inject 0.3 mLs into the muscle once as needed (allergy) Use as directed for allergic reaction 6/13/19 7/21/21  Neymar Shrestha II, PA-C       Review of Systems:  Constitutional: negative for fevers or chills  Eyes: negative for visual disturbance   ENT: negative for sore throat or nasal congestion  Respiratory: negative for shortness of breath or cough  Cardiovascular: negative for chest pain ,palpitations,pnd,syncope  Gastrointestinal: negative for abd pain, nausea, vomiting, diarrhea , constipation,hemetemesis,pavan,blood in stool  Genitourinary: negative for dysuria, urgency ,frequency,hematuria  Integument/breast: negative for skin rash or lesions  Neurological: negative for unilateral weakness, numbness or tingling. Skeletal Muscular: no joint pain,jont swelling,back pain    Subjective:  Vitals:    06/21/22 0820   BP: 122/86         Exam:  GEN:   A & O x3, no apparent distress  EYES: No gross abnormalities. ENT:ENT exam normal, no neck nodes or sinus tenderness  NECK: normal, supple, no lymphadenopathy,  no carotid bruits  PULM: clear to auscultation bilaterally- no wheezes, rales or rhonchi, normal air movement, no respiratory distress  COR: regular rate & rhythm, no murmurs and no gallops  ABD:  soft, non-tender  : deferred  EXT: Extremities: + 2 pedal pulses, no edema or calf tenderness, and warm to touch. Normal nails without lesions  Skeletomuscular:  NEURO: Motor and sensory grossly intact  SKIN:  No skin lesions or rashes      Assessment:  1. Hypothyroidism, unspecified type    2. Chronic neck pain        Plan:    ICD-10-CM    1. Hypothyroidism, unspecified type  E03.9 CBC with Auto Differential     Comprehensive Metabolic Panel     TSH   2.  Chronic neck pain  M54.2     G89.29        Orders Placed This Encounter   Procedures    CBC with Auto Differential     Standing Status:   Future     Standing Expiration Date:   6/21/2023    Comprehensive Metabolic Panel Standing Status:   Future     Standing Expiration Date:   6/21/2023    TSH     Standing Status:   Future     Standing Expiration Date:   6/21/2023     No follow-ups on file. No orders of the defined types were placed in this encounter.     Medication directions and side effects discussed      Electronically signed by Kiera Pimentel MD on 6/21/2022 at 8:37 AM         Kiera Pimentel MD, MD

## 2022-07-28 DIAGNOSIS — E03.9 HYPOTHYROIDISM, UNSPECIFIED TYPE: ICD-10-CM

## 2022-07-28 RX ORDER — LEVOTHYROXINE SODIUM 0.07 MG/1
75 TABLET ORAL DAILY
Qty: 90 TABLET | Refills: 1 | Status: SHIPPED | OUTPATIENT
Start: 2022-07-28

## 2022-10-18 ENCOUNTER — E-VISIT (OUTPATIENT)
Dept: PRIMARY CARE CLINIC | Age: 42
End: 2022-10-18
Payer: COMMERCIAL

## 2022-10-18 DIAGNOSIS — R30.0 DYSURIA: Primary | ICD-10-CM

## 2022-10-18 PROCEDURE — 99422 OL DIG E/M SVC 11-20 MIN: CPT | Performed by: NURSE PRACTITIONER

## 2022-10-18 RX ORDER — NITROFURANTOIN 25; 75 MG/1; MG/1
100 CAPSULE ORAL 2 TIMES DAILY
Qty: 10 CAPSULE | Refills: 0 | Status: SHIPPED | OUTPATIENT
Start: 2022-10-18 | End: 2022-10-23

## 2022-10-18 NOTE — PROGRESS NOTES
Linda Hernandez (1980) initiated an asynchronous digital communication through [address] [place] [address]. HPI: per patient questionnaire     Exam: not applicable    Diagnoses and all orders for this visit:  Diagnoses and all orders for this visit:    Dysuria    Other orders  -     nitrofurantoin, macrocrystal-monohydrate, (MACROBID) 100 MG capsule; Take 1 capsule by mouth 2 times daily for 5 days      Reviewed questionnaire  Reviewed previous encounters, labs, meds, allergies and history    Increase oral fluids. Tylenol or motrin for pain. May take azo over the counter    Please f/u with pcp if symptoms do not improve for further evaluation and possible urine culture    Time: EV2 - 11-20 minutes were spent on the digital evaluation and management of this patient.  14 min     TOM Carballo - CNP

## 2022-10-24 ENCOUNTER — OFFICE VISIT (OUTPATIENT)
Dept: PRIMARY CARE CLINIC | Age: 42
End: 2022-10-24
Payer: COMMERCIAL

## 2022-10-24 VITALS
TEMPERATURE: 97.5 F | HEIGHT: 67 IN | DIASTOLIC BLOOD PRESSURE: 80 MMHG | BODY MASS INDEX: 29.66 KG/M2 | SYSTOLIC BLOOD PRESSURE: 120 MMHG | WEIGHT: 189 LBS | OXYGEN SATURATION: 99 % | HEART RATE: 64 BPM

## 2022-10-24 DIAGNOSIS — K64.9 HEMORRHOIDS, UNSPECIFIED HEMORRHOID TYPE: Primary | ICD-10-CM

## 2022-10-24 PROCEDURE — 99213 OFFICE O/P EST LOW 20 MIN: CPT | Performed by: NURSE PRACTITIONER

## 2022-10-24 RX ORDER — HYDROCORTISONE ACETATE 25 MG/1
25 SUPPOSITORY RECTAL EVERY 12 HOURS
Qty: 28 SUPPOSITORY | Refills: 0 | Status: SHIPPED | OUTPATIENT
Start: 2022-10-24 | End: 2022-11-07

## 2022-10-24 SDOH — ECONOMIC STABILITY: FOOD INSECURITY: WITHIN THE PAST 12 MONTHS, THE FOOD YOU BOUGHT JUST DIDN'T LAST AND YOU DIDN'T HAVE MONEY TO GET MORE.: NEVER TRUE

## 2022-10-24 SDOH — ECONOMIC STABILITY: FOOD INSECURITY: WITHIN THE PAST 12 MONTHS, YOU WORRIED THAT YOUR FOOD WOULD RUN OUT BEFORE YOU GOT MONEY TO BUY MORE.: NEVER TRUE

## 2022-10-24 ASSESSMENT — ENCOUNTER SYMPTOMS
CONSTIPATION: 1
ANAL BLEEDING: 1
BLOOD IN STOOL: 0

## 2022-10-24 ASSESSMENT — SOCIAL DETERMINANTS OF HEALTH (SDOH): HOW HARD IS IT FOR YOU TO PAY FOR THE VERY BASICS LIKE FOOD, HOUSING, MEDICAL CARE, AND HEATING?: NOT HARD AT ALL

## 2022-10-24 NOTE — PROGRESS NOTES
02/17/2017    COVID-19 Vaccine (3 - Booster for Moderna series) 07/21/2021    Flu vaccine (1) 08/01/2022       Past Surgical History:     Past Surgical History:   Procedure Laterality Date    TUBAL LIGATION  4/2012    ESSURE (Dr Ida Rice)        Medications:       Prior to Admission medications    Medication Sig Start Date End Date Taking? Authorizing Provider   hydrocortisone (ANUSOL-HC) 25 MG suppository Place 1 suppository rectally in the morning and 1 suppository in the evening. Do all this for 14 days. 10/24/22 11/7/22 Yes TOM Lucio CNP   levothyroxine (SYNTHROID) 75 MCG tablet Take 1 tablet by mouth in the morning. 7/28/22  Yes [address], MD   albuterol sulfate HFA (PROAIR HFA) 108 (90 Base) MCG/ACT inhaler Inhale 2 puffs into the lungs every 6 hours as needed for Wheezing 6/9/22  Yes [address], MD   meloxicam (MOBIC) 15 MG tablet Take 1 tablet by mouth daily as needed for Pain 6/9/22  Yes [address], MD   spironolactone (ALDACTONE) 100 MG tablet Take 1 tablet by mouth daily 12/9/19  Yes [address], MD   sulfamethoxazole-trimethoprim (BACTRIM;SEPTRA) 400-80 MG per tablet Take 1 tablet by mouth daily as needed (to prevent uti) 12/9/19  Yes [address], MD   EPINEPHrine (EPIPEN 2-TARA) 0.3 MG/0.3ML SOAJ injection Inject 0.3 mLs into the muscle once for 1 dose Use as directed for allergic reaction 7/21/21 7/21/21  Shakira Bauman MD   EPINEPHrine (EPIPEN 2-TARA) 0.3 MG/0.3ML SOAJ injection Inject 0.3 mLs into the muscle once as needed (allergy) Use as directed for allergic reaction 6/13/19 7/21/21  Abbey Campuzano II, PA-C        Allergies: Iv dye [iodides] and Optiray [ioversol]    Social History:     Tobacco:    reports that she has quit smoking. Her smoking use included cigarettes. She has a 13.00 pack-year smoking history. She quit smokeless tobacco use about 6 years ago. Alcohol:      reports current alcohol use.   Drug Use:  reports no history of drug use.    Family History:     Family History   Problem Relation Age of Onset    High Blood Pressure Mother     Heart Disease Father        Review of Systems:     Positive and Negative as described in HPI    Review of Systems   Gastrointestinal:  Positive for anal bleeding and constipation. Negative for blood in stool. Physical Exam:   Vitals:  /80   Pulse 64   Temp 97.5 °F (36.4 °C)   Ht 5' 7.01\" (1.702 m)   Wt 189 lb (85.7 kg)   SpO2 99%   BMI 29.59 kg/m²     Physical Exam  Constitutional:       General: She is not in acute distress. Appearance: Normal appearance. She is not ill-appearing or toxic-appearing. Cardiovascular:      Rate and Rhythm: Normal rate and regular rhythm. Heart sounds: Normal heart sounds. No murmur heard. Pulmonary:      Effort: Pulmonary effort is normal. No respiratory distress. Breath sounds: Normal breath sounds. No stridor. No wheezing, rhonchi or rales. Genitourinary:     Comments: Single, thrombosed hemorrhoid external.  No internal palpable rectal wall masses. Neurological:      Mental Status: She is alert and oriented to person, place, and time. Psychiatric:         Mood and Affect: Mood normal.         Behavior: Behavior normal.         Thought Content:  Thought content normal.         Judgment: Judgment normal.       Data:     Lab Results   Component Value Date/Time     06/21/2022 09:04 AM    K 5.0 06/21/2022 09:04 AM     06/21/2022 09:04 AM    CO2 24 06/21/2022 09:04 AM    BUN 14 06/21/2022 09:04 AM    CREATININE 0.78 06/21/2022 09:04 AM    GLUCOSE 91 06/21/2022 09:04 AM    GLUCOSE 78 07/26/2021 08:45 AM    PROT 7.2 06/21/2022 09:04 AM    LABALBU 4.7 06/21/2022 09:04 AM    LABALBU 3.7 01/20/2012 07:40 PM    BILITOT 0.40 06/21/2022 09:04 AM    ALKPHOS 53 06/21/2022 09:04 AM    AST 10 06/21/2022 09:04 AM    ALT 12 06/21/2022 09:04 AM     Lab Results   Component Value Date/Time    WBC 5.7 06/21/2022 09:04 AM    RBC 4.47 06/21/2022 09:04 AM    RBC 4.51 07/26/2021 08:45 AM    HGB 13.3 06/21/2022 09:04 AM    HCT 42.4 06/21/2022 09:04 AM    MCV 94.9 06/21/2022 09:04 AM    MCH 29.8 06/21/2022 09:04 AM    MCHC 31.4 06/21/2022 09:04 AM    RDW 12.9 06/21/2022 09:04 AM     06/21/2022 09:04 AM     02/03/2012 06:23 AM    MPV 10.3 06/21/2022 09:04 AM     Lab Results   Component Value Date/Time    TSH 0.68 06/21/2022 09:04 AM     Lab Results   Component Value Date/Time    CHOL 208 02/03/2018 08:06 AM    HDL 59 02/03/2018 08:06 AM       Assessment/Plan:      Diagnosis Orders   1. Hemorrhoids, unspecified hemorrhoid type          - Discussed ways to prevent constipation including remaining well-hydrated with 64 ounces of water daily, high-fiber diet, routine exercise, and 1 capful MiraLAX daily  - Encourage sitz bath's  - Rx anusol suppository twice daily x14 days  - Reviewed worsening signs and symptoms of hemorrhoids and when to notify office.  - Reviewed signs and symptoms of significant bleeding including lightheadedness, dizziness, tachycardia, hypotension and when to seek immediate care  - Follow-up 2 weeks for hemorrhoid reevaluation    Completed Refills   Requested Prescriptions     Signed Prescriptions Disp Refills    hydrocortisone (ANUSOL-HC) 25 MG suppository 28 suppository 0     Sig: Place 1 suppository rectally in the morning and 1 suppository in the evening. Do all this for 14 days. No orders of the defined types were placed in this encounter. No results found for this visit on 10/24/22. Return in about 2 weeks (around 11/7/2022), or if symptoms worsen or fail to improve, for hemorrhoid f/u .     Electronically signed by TOM Lucio CNP on 10/25/22 at 7:50 AM.

## 2022-10-24 NOTE — PATIENT INSTRUCTIONS
SURVEY:    You may be receiving a survey from [employer] regarding your visit today. Please complete the survey to enable us to provide the highest quality of care to you and your family. If you cannot score us a very good on any question, please call the office to discuss how we could of made your experience a very good one. Thank you.

## 2022-11-07 ENCOUNTER — OFFICE VISIT (OUTPATIENT)
Dept: PRIMARY CARE CLINIC | Age: 42
End: 2022-11-07
Payer: COMMERCIAL

## 2022-11-07 VITALS
SYSTOLIC BLOOD PRESSURE: 125 MMHG | WEIGHT: 192 LBS | HEART RATE: 70 BPM | BODY MASS INDEX: 30.06 KG/M2 | DIASTOLIC BLOOD PRESSURE: 79 MMHG | RESPIRATION RATE: 16 BRPM

## 2022-11-07 DIAGNOSIS — K60.2 FISSURE IN ANO: ICD-10-CM

## 2022-11-07 DIAGNOSIS — K64.4 EXTERNAL HEMORRHOIDS: Primary | ICD-10-CM

## 2022-11-07 PROCEDURE — 99213 OFFICE O/P EST LOW 20 MIN: CPT | Performed by: FAMILY MEDICINE

## 2022-11-07 NOTE — PATIENT INSTRUCTIONS
SURVEY:    You may be receiving a survey from [employer] regarding your visit today. You may get this in the mail, through your MyChart, or in your email. Please complete the survey to enable us to provide the highest quality of care to you and your family. If you cannot score us a very good (5 Stars) on any question, please call the office to discuss how we could of made your experience exceptional.    Thank you!     Dr. Donell Marquez, MAURO Infante, [address] [place] [address], [address], [address]    Phone: [phone]  Fax: [phone]    Office Hours:   Camilo Cui, [address], F: 8-5

## 2022-11-07 NOTE — PROGRESS NOTES
Patient is here for a two week follow up since seeing Cyril Grover for hemorrhoids. Patient states that they are still there, bleeding, and hurt. She said the bleeding is not as bad as it was before. She was put on a steroid at the office, and has a few days left of the medication. Has chronic slow transit constipation. She has been having soft bm daily since starting miralax. Had h/o hemorrhoids after her previous pregnancies. CURRENT ALLERGIES: Iv dye [iodides] and Optiray [ioversol]    PAST MEDICAL HISTORY:   Past Medical History:   Diagnosis Date    Acne     Anxiety     Asthma     Dizziness and giddiness     Headache     Infection of skin and subcutaneous tissue     Palpitations     Stress        SURGICAL HISTORY:   Past Surgical History:   Procedure Laterality Date    TUBAL LIGATION  4/2012    ESSURE (Dr Manjula Collier)       FAMILY HISTORY:   Family History   Problem Relation Age of Onset    High Blood Pressure Mother     Heart Disease Father        SOCIAL HISTORY:   Social History     Tobacco Use    Smoking status: Former     Packs/day: 1.00     Years: 13.00     Pack years: 13.00     Types: Cigarettes    Smokeless tobacco: Former     Quit date: 8/24/2016   Substance Use Topics    Alcohol use: Yes     Comment: rare    Drug use: No     Prior to Admission medications    Medication Sig Start Date End Date Taking?  Authorizing Provider   levothyroxine (SYNTHROID) 75 MCG tablet Take 1 tablet by mouth in the morning. 7/28/22   Elier Otoole MD   albuterol sulfate HFA (PROAIR HFA) 108 (90 Base) MCG/ACT inhaler Inhale 2 puffs into the lungs every 6 hours as needed for Wheezing 6/9/22   Elier Otoole MD   meloxicam (MOBIC) 15 MG tablet Take 1 tablet by mouth daily as needed for Pain 6/9/22   Elier Otoole MD   EPINEPHrine (EPIPEN 2-TARA) 0.3 MG/0.3ML SOAJ injection Inject 0.3 mLs into the muscle once for 1 dose Use as directed for allergic reaction 7/21/21 7/21/21  Jewels Parsons MD   spironolactone (ALDACTONE) 100 MG tablet Take 1 tablet by mouth daily 12/9/19   Elier Otoole MD   sulfamethoxazole-trimethoprim (BACTRIM;SEPTRA) 400-80 MG per tablet Take 1 tablet by mouth daily as needed (to prevent uti) 12/9/19   Elier Otoole MD   EPINEPHrine (EPIPEN 2-TARA) 0.3 MG/0.3ML SOAJ injection Inject 0.3 mLs into the muscle once as needed (allergy) Use as directed for allergic reaction 6/13/19 7/21/21  Stephanie Thompson II, PA-C       Review of Systems:  Constitutional: negative for fevers or chills  Eyes: negative for visual disturbance   ENT: negative for sore throat or nasal congestion  Respiratory: negative for shortness of breath or cough  Cardiovascular: negative for chest pain ,palpitations,pnd,syncope  Gastrointestinal: negative for abd pain, nausea, vomiting, diarrhea , positive for constipation,neg for hemetemesis,pavan,positive for blood in stool  Genitourinary: negative for dysuria, urgency ,frequency,hematuria      Subjective:  Vitals:    11/07/22 1549   BP: 125/79   Pulse: 70   Resp: 16         Exam:  GEN:   A & O x3, no apparent distress  ABD:  soft, non-tender, non-distended, normal bowel sounds, no masses or organomegaly  Rectal exam- has post fissure in ano,small ext hemorrhoid,no bleeding  EXT: Extremities: + 2 pedal pulses, no edema or calf tenderness, and warm to touch. Normal nails without lesions    Assessment:  1. External hemorrhoids    2. Fissure in ano        Plan:    ICD-10-CM    1. External hemorrhoids - continue sitz bath and miralax K64.4       2. Fissure in ano - continue annusol hc suppository,sitz bath and miralax  F/u if worsening of symptoms  To avoid constipation K60.2           No orders of the defined types were placed in this encounter. Return if symptoms worsen or fail to improve. No orders of the defined types were placed in this encounter.     Medication directions and side effects discussed      Electronically signed by Elier Otoole MD on 11/8/2022 at 8:23 AM [place] MD Meghna, MD

## 2022-11-08 ENCOUNTER — PATIENT MESSAGE (OUTPATIENT)
Dept: PRIMARY CARE CLINIC | Age: 42
End: 2022-11-08

## 2022-11-08 DIAGNOSIS — K62.5 RECTAL BLEEDING: Primary | ICD-10-CM

## 2022-11-08 NOTE — TELEPHONE ENCOUNTER
Melissa, Processor 11/8/2022 2:56 PM EST    Go ahead with the referral the doctor is within my network

## 2022-11-28 RX ORDER — MELOXICAM 15 MG/1
15 TABLET ORAL DAILY PRN
Qty: 30 TABLET | Refills: 2 | Status: SHIPPED | OUTPATIENT
Start: 2022-11-28

## 2022-11-28 RX ORDER — MELOXICAM 15 MG/1
TABLET ORAL
Qty: 30 TABLET | Refills: 2 | Status: SHIPPED | OUTPATIENT
Start: 2022-11-28

## 2022-12-27 DIAGNOSIS — E03.9 HYPOTHYROIDISM, UNSPECIFIED TYPE: ICD-10-CM

## 2022-12-27 RX ORDER — LEVOTHYROXINE SODIUM 0.07 MG/1
TABLET ORAL
Qty: 90 TABLET | Refills: 1 | Status: SHIPPED | OUTPATIENT
Start: 2022-12-27

## 2022-12-27 RX ORDER — LEVOTHYROXINE SODIUM 0.07 MG/1
75 TABLET ORAL DAILY
Qty: 90 TABLET | Refills: 1 | OUTPATIENT
Start: 2022-12-27

## 2023-02-02 ENCOUNTER — OFFICE VISIT (OUTPATIENT)
Dept: UROLOGY | Age: 43
End: 2023-02-02

## 2023-02-02 VITALS
SYSTOLIC BLOOD PRESSURE: 143 MMHG | DIASTOLIC BLOOD PRESSURE: 91 MMHG | TEMPERATURE: 97.3 F | HEIGHT: 67 IN | BODY MASS INDEX: 30.61 KG/M2 | HEART RATE: 70 BPM | WEIGHT: 195 LBS

## 2023-02-02 DIAGNOSIS — N39.46 MIXED INCONTINENCE: Primary | ICD-10-CM

## 2023-02-02 ASSESSMENT — ENCOUNTER SYMPTOMS
BACK PAIN: 0
VOMITING: 0
SHORTNESS OF BREATH: 0
NAUSEA: 0
COUGH: 0
EYE REDNESS: 0
CONSTIPATION: 0
APNEA: 0
WHEEZING: 0
COLOR CHANGE: 0
ABDOMINAL PAIN: 0

## 2023-02-02 NOTE — PROGRESS NOTES
HPI:        Patient is a 43 y.o. female in no acute distress. She is alert and oriented to person, place, and time. Pt os here today as a new pt. patient has been seen by our office approximately 6 years ago. At that point time we recommended poise pads. She was using these for a long time to help with her incontinence. Currently she is complaining about worsening incontinence as well as a \"\" drop bladder. Patient does not have any pain with intercourse. She has no issues with urinary tract infections. Patient's main issue is with urinary incontinence. This is mainly stress incontinence. Patient does wear pads. She does have issues with urgency and frequency. No flank pain nausea vomiting today. Patient has never had kidney stones. The other time she is ever seen urology was for same issue previously with us. No pain today.     Past Medical History:   Diagnosis Date    Acne     Anxiety     Asthma     Dizziness and giddiness     Headache     Infection of skin and subcutaneous tissue     Palpitations     Stress      Past Surgical History:   Procedure Laterality Date    TUBAL LIGATION  4/2012    ESSURE (Dr Shahram Moran)     Outpatient Encounter Medications as of 2/2/2023   Medication Sig Dispense Refill    levothyroxine (SYNTHROID) 75 MCG tablet TAKE ONE TABLET BY MOUTH EVERY MORNING 90 tablet 1    meloxicam (MOBIC) 15 MG tablet TAKE ONE TABLET BY MOUTH DAILY AS NEEDED FOR PAIN 30 tablet 2    albuterol sulfate HFA (PROAIR HFA) 108 (90 Base) MCG/ACT inhaler Inhale 2 puffs into the lungs every 6 hours as needed for Wheezing 1 each 0    spironolactone (ALDACTONE) 100 MG tablet Take 1 tablet by mouth daily 90 tablet 0    sulfamethoxazole-trimethoprim (BACTRIM;SEPTRA) 400-80 MG per tablet Take 1 tablet by mouth daily as needed (to prevent uti) 30 tablet 2    [DISCONTINUED] meloxicam (MOBIC) 15 MG tablet Take 1 tablet by mouth daily as needed for Pain 30 tablet 2    EPINEPHrine (EPIPEN 2-TARA) 0.3 MG/0.3ML SOAJ injection Inject 0.3 mLs into the muscle once for 1 dose Use as directed for allergic reaction 0.3 mL 0    EPINEPHrine (EPIPEN 2-TARA) 0.3 MG/0.3ML SOAJ injection Inject 0.3 mLs into the muscle once as needed (allergy) Use as directed for allergic reaction 1 each 2     No facility-administered encounter medications on file as of 2/2/2023. Current Outpatient Medications on File Prior to Visit   Medication Sig Dispense Refill    levothyroxine (SYNTHROID) 75 MCG tablet TAKE ONE TABLET BY MOUTH EVERY MORNING 90 tablet 1    meloxicam (MOBIC) 15 MG tablet TAKE ONE TABLET BY MOUTH DAILY AS NEEDED FOR PAIN 30 tablet 2    albuterol sulfate HFA (PROAIR HFA) 108 (90 Base) MCG/ACT inhaler Inhale 2 puffs into the lungs every 6 hours as needed for Wheezing 1 each 0    spironolactone (ALDACTONE) 100 MG tablet Take 1 tablet by mouth daily 90 tablet 0    sulfamethoxazole-trimethoprim (BACTRIM;SEPTRA) 400-80 MG per tablet Take 1 tablet by mouth daily as needed (to prevent uti) 30 tablet 2    EPINEPHrine (EPIPEN 2-TARA) 0.3 MG/0.3ML SOAJ injection Inject 0.3 mLs into the muscle once for 1 dose Use as directed for allergic reaction 0.3 mL 0    EPINEPHrine (EPIPEN 2-TARA) 0.3 MG/0.3ML SOAJ injection Inject 0.3 mLs into the muscle once as needed (allergy) Use as directed for allergic reaction 1 each 2     No current facility-administered medications on file prior to visit. Iv dye [iodides] and Optiray [ioversol]  Family History   Problem Relation Age of Onset    High Blood Pressure Mother     Heart Disease Father      Social History     Tobacco Use   Smoking Status Former    Packs/day: 1.00    Years: 13.00    Pack years: 13.00    Types: Cigarettes   Smokeless Tobacco Former    Quit date: 8/24/2016       Social History     Substance and Sexual Activity   Alcohol Use Yes    Comment: rare       Review of Systems   Constitutional:  Negative for appetite change, chills and fever.    Eyes:  Negative for redness and visual disturbance. Respiratory:  Negative for apnea, cough, shortness of breath and wheezing. Cardiovascular:  Negative for chest pain and leg swelling. Gastrointestinal:  Negative for abdominal pain, constipation, nausea and vomiting. Genitourinary:  Positive for enuresis, frequency and urgency. Negative for difficulty urinating, dyspareunia, dysuria, flank pain, hematuria, pelvic pain, vaginal bleeding and vaginal discharge. Musculoskeletal:  Negative for back pain, joint swelling and myalgias. Skin:  Negative for color change, rash and wound. Neurological:  Negative for dizziness, tremors and numbness. Hematological:  Negative for adenopathy. Does not bruise/bleed easily. Psychiatric/Behavioral:  Negative for sleep disturbance. BP (!) 143/91 (Site: Right Upper Arm, Position: Sitting, Cuff Size: Medium Adult)   Pulse 70   Temp 97.3 °F (36.3 °C)   Ht 5' 7\" (1.702 m)   Wt 195 lb (88.5 kg)   BMI 30.54 kg/m²       PHYSICAL EXAM:  Constitutional: Patient resting comfortably, in no acute distress. Neuro: Alert and oriented to person place and time. Cranial nerves grossly intact. Psych: Mood and affect normal.  Skin: Warm, dry  HEENT: normocephalic, atraumatic  Lymphatics: No palpable lymphadenopathy  Lungs: Respiratory effort normal, unlabored  Cardiovascular:  Normal peripheral pulses  Abdomen: Soft, non-tender, non-distended with no organomegaly or palpable masses. : No CVA tenderness. Bladder non-tender and not distended. Pelvic: deferred    Lab Results   Component Value Date    BUN 14 06/21/2022     Lab Results   Component Value Date    CREATININE 0.78 06/21/2022       ASSESSMENT:  This is a 43 y.o. female with the following diagnoses:   Diagnosis Orders   1. Mixed incontinence  SD YOHANA POST-VOIDING RESIDUAL URINE&/BLADDER CAP             PLAN:  We will plan on seeing her back for cystoscopy and pelvic examination. We did briefly discuss pelvic for muscle training today.   We will plan for future treatment planning when she returns.  Patient did state today that she is unable to find impressia pads anymore.

## 2023-02-02 NOTE — PATIENT INSTRUCTIONS
SURVEY:    You may be receiving a survey from [employer] regarding your visit today.    Please complete the survey to enable us to provide the highest quality of care to you and your family.    If you cannot score us a very good on any question, please call the office to discuss how we could have made your experience a very good one.    Thank you.

## 2023-02-17 ENCOUNTER — HOSPITAL ENCOUNTER (OUTPATIENT)
Age: 43
Discharge: HOME | End: 2023-02-17
Payer: COMMERCIAL

## 2023-02-17 ENCOUNTER — HOSPITAL ENCOUNTER (OUTPATIENT)
Dept: GENERAL RADIOLOGY | Age: 43
End: 2023-02-17
Payer: COMMERCIAL

## 2023-02-17 DIAGNOSIS — M54.2 CERVICAL PAIN (NECK): ICD-10-CM

## 2023-02-17 PROCEDURE — 72040 X-RAY EXAM NECK SPINE 2-3 VW: CPT

## 2023-02-24 RX ORDER — MELOXICAM 15 MG/1
TABLET ORAL
Qty: 30 TABLET | Refills: 2 | OUTPATIENT
Start: 2023-02-24

## 2023-02-28 RX ORDER — MELOXICAM 15 MG/1
15 TABLET ORAL DAILY
Qty: 30 TABLET | Refills: 2 | Status: SHIPPED | OUTPATIENT
Start: 2023-02-28